# Patient Record
Sex: MALE | Race: WHITE | Employment: FULL TIME | URBAN - METROPOLITAN AREA
[De-identification: names, ages, dates, MRNs, and addresses within clinical notes are randomized per-mention and may not be internally consistent; named-entity substitution may affect disease eponyms.]

---

## 2017-06-13 ENCOUNTER — HOSPITAL ENCOUNTER (OUTPATIENT)
Dept: CT IMAGING | Age: 57
Discharge: HOME | End: 2017-06-13
Attending: INTERNAL MEDICINE
Payer: SELF-PAY

## 2017-06-13 DIAGNOSIS — Z91.89 CARDIOVASCULAR RISK FACTOR: ICD-10-CM

## 2017-06-13 PROCEDURE — 75571 CT HRT W/O DYE W/CA TEST: CPT

## 2021-09-22 PROBLEM — R35.1 BENIGN PROSTATIC HYPERPLASIA WITH NOCTURIA: Status: ACTIVE | Noted: 2021-09-22

## 2021-09-22 PROBLEM — Z76.89 ENCOUNTER TO ESTABLISH CARE: Status: ACTIVE | Noted: 2021-09-22

## 2021-09-22 PROBLEM — N40.1 BENIGN PROSTATIC HYPERPLASIA WITH NOCTURIA: Status: ACTIVE | Noted: 2021-09-22

## 2021-09-22 PROBLEM — M21.612 BUNION, LEFT: Status: ACTIVE | Noted: 2021-09-22

## 2022-10-05 ENCOUNTER — OFFICE VISIT (OUTPATIENT)
Dept: INTERNAL MEDICINE CLINIC | Facility: CLINIC | Age: 62
End: 2022-10-05
Payer: COMMERCIAL

## 2022-10-05 VITALS
RESPIRATION RATE: 12 BRPM | TEMPERATURE: 98.3 F | HEIGHT: 68 IN | SYSTOLIC BLOOD PRESSURE: 121 MMHG | DIASTOLIC BLOOD PRESSURE: 85 MMHG | BODY MASS INDEX: 25.67 KG/M2 | WEIGHT: 169.4 LBS | OXYGEN SATURATION: 98 % | HEART RATE: 59 BPM

## 2022-10-05 DIAGNOSIS — Z13.21 ENCOUNTER FOR VITAMIN DEFICIENCY SCREENING: ICD-10-CM

## 2022-10-05 DIAGNOSIS — Z13.220 ENCOUNTER FOR SCREENING FOR LIPID DISORDER: Primary | ICD-10-CM

## 2022-10-05 DIAGNOSIS — Z12.5 PROSTATE CANCER SCREENING: ICD-10-CM

## 2022-10-05 DIAGNOSIS — R13.12 OROPHARYNGEAL DYSPHAGIA: ICD-10-CM

## 2022-10-05 DIAGNOSIS — Z13.1 DIABETES MELLITUS SCREENING: ICD-10-CM

## 2022-10-05 DIAGNOSIS — Z00.00 ANNUAL VISIT FOR GENERAL ADULT MEDICAL EXAMINATION WITHOUT ABNORMAL FINDINGS: ICD-10-CM

## 2022-10-05 PROCEDURE — G8484 FLU IMMUNIZE NO ADMIN: HCPCS | Performed by: INTERNAL MEDICINE

## 2022-10-05 PROCEDURE — 99396 PREV VISIT EST AGE 40-64: CPT | Performed by: INTERNAL MEDICINE

## 2022-10-05 ASSESSMENT — PATIENT HEALTH QUESTIONNAIRE - PHQ9
SUM OF ALL RESPONSES TO PHQ QUESTIONS 1-9: 0
SUM OF ALL RESPONSES TO PHQ QUESTIONS 1-9: 0
SUM OF ALL RESPONSES TO PHQ9 QUESTIONS 1 & 2: 0
SUM OF ALL RESPONSES TO PHQ QUESTIONS 1-9: 0
1. LITTLE INTEREST OR PLEASURE IN DOING THINGS: 0
SUM OF ALL RESPONSES TO PHQ QUESTIONS 1-9: 0
2. FEELING DOWN, DEPRESSED OR HOPELESS: 0

## 2022-10-05 ASSESSMENT — ENCOUNTER SYMPTOMS
RESPIRATORY NEGATIVE: 1
EYES NEGATIVE: 1
VOMITING: 0
NAUSEA: 0

## 2022-10-05 NOTE — PROGRESS NOTES
Niru Suarez D.O. [facility]  Jeffrey Diadema [address] [place] [address]  Tel: [phone]    History and Physical Office Visit     Patient Name: Maggie Landers    :  1960   MRN:   [id number]      Today's Date: 10/05/22 8:09 AM    Subjective     The patient is a 60y.o. year old male with a pmh as listed below. The patient presents today for a physical.    Diet: He states he has been eating pretty healthy in the last year. Exercise: He exercises on most days   Alcohol: he will drink 5 glasses of wine a week and 3 glasses of beer a week   Cigarettes: Never     SAFETY PLAN:  -He denies any suicidal ideations or homicidal ideations  -Denies any thoughts of harming himself or others  -He states he feels safe at home and has a good support system in the area    Health Maintenance:  -Last PSA 3.6 in   -Last colonoscopy completed in  which showed some polyps, next due in   -Lipid panel from  showed HDL of 49, LDL of 94, triglycerides of 149 and a total cholesterol 153  -Hemoglobin A1c from  was 5.6    New complaints:  He states he is still having some symptoms of weaker urinary stream and difficulty starting going. He states he has not started the Flomax from 1 year ago. He states from time to time he will have difficulty swallowing. He states this occurs more often with meats, but overall it does not happen very often. He states he has the feeling of food getting stuck in his throat, this sometimes will happen with liquids too. He states this happens about once every month. He denies pain, he denies any regurgitation of food. He states he is able to initially swallow the food/liquid but it will feel stuck towards the bottom of his throat. This has been going on for a long time. Review of Systems   Constitutional: Negative. HENT: Negative. Eyes: Negative. Respiratory: Negative. Cardiovascular: Negative.     Gastrointestinal:  Negative for nausea and vomiting. Endocrine: Negative. Genitourinary: Negative. Musculoskeletal: Negative. Skin: Negative. Neurological: Negative. Psychiatric/Behavioral: Negative. Past Medical History:   Diagnosis Date    Benign prostatic hyperplasia with nocturia 9/22/2021    Bunion, left 9/22/2021    Colon polyps     Dislocation of left shoulder joint 02/2020    Encounter to establish care 9/22/2021    History of basal cell carcinoma (BCC) of skin     Back    Prediabetes      Social History     Socioeconomic History    Marital status:      Spouse name: Not on file    Number of children: Not on file    Years of education: Not on file    Highest education level: Not on file   Occupational History    Not on file   Tobacco Use    Smoking status: Never    Smokeless tobacco: Never   Substance and Sexual Activity    Alcohol use: Yes     Alcohol/week: 11.0 standard drinks    Drug use: No    Sexual activity: Not on file   Other Topics Concern    Not on file   Social History Narrative    Not on file     Social Determinants of Health     Financial Resource Strain: Not on file   Food Insecurity: Not on file   Transportation Needs: Not on file   Physical Activity: Not on file   Stress: Not on file   Social Connections: Not on file   Intimate Partner Violence: Not on file   Housing Stability: Not on file         Current Outpatient Medications   Medication Sig    tamsulosin (FLOMAX) 0.4 MG capsule Take 0.4 mg by mouth daily     No current facility-administered medications for this visit. Objective     There were no vitals filed for this visit. Physical Exam:  Constitutional: Appears well kempt. Alert/oriented x3. In no acute distress. Head: Normocephalic No trauma. No deformity. No bruits. Neck: Supple. ROM normal. No tenderness. No masses. Eyes: PERRLA. Conjunctivae normal. No discharge. Ears: External ears normal. TM normal. No discharge from ears. Nose: Nose normal. Nares patent. Throat: Clear.  No exudates. No erythema. Cardiac: Heart with normal rate/rhythm. No murmurs. No gallops. Pulses normal.   Pulmonary: Lungs clear to auscultation bilaterally. In no respiratory distress. No wheezing. No rales. No rhonci. Gastrointestinal: Bowel sounds present. Abdomen soft and nondistended. Musculoskeletal: Moves all extremities with good ROM. Non-tender. No swelling. No edema. Neurological: No numbness. No tingling. Alert and oriented. At baseline. No confusion. Patellar Reflexes 2+. Psychiatric: Normal thought content. Normal behavior. Normal judgment. Recommendations     Assessment:  Patient Active Problem List   Diagnosis    Encounter to establish care    Colon polyps    Prediabetes    Benign prostatic hyperplasia with nocturia      Status of Medical Conditions: stable     Plan:  -Patient is a 60 extremely healthy male who presents for a physical. He has no new complaints except for some dysphagia with solids and liquids that has been going on for a while. His labs from 2021 were fantastic, but he is requesting a new set of labs today since he fasted. -GI referral for dysphagia  -check labs (per patient request)  -follow up with dermatology  -follow up with eye doctor     -Previous medical records and/or labs/tests available to me reviewed, any records outstanding not available requested  -The risks, benefits, and medical necessity of all medications, tests labs, and any other orders that were ordered at today's visit were discussed with the patient including all elective or patient requested orders. Decision to order or not order tests or based on this risk/benefit ratio and medical necessity.  -The patient expresses understanding of the plan as I've explained it to him and is in agreement with the current plan.     Follow up: 1 year    Total Time: 45 minutes (chart review and in-exam time)    Signed: Jeanne Rosas D.O.  10/05/22  8:09 AM

## 2022-10-06 DIAGNOSIS — R97.20 ELEVATED PSA: Primary | ICD-10-CM

## 2022-10-19 ENCOUNTER — OFFICE VISIT (OUTPATIENT)
Dept: UROLOGY | Age: 62
End: 2022-10-19
Payer: COMMERCIAL

## 2022-10-19 DIAGNOSIS — N13.8 BPH WITH OBSTRUCTION/LOWER URINARY TRACT SYMPTOMS: ICD-10-CM

## 2022-10-19 DIAGNOSIS — R97.20 ELEVATED PSA: Primary | ICD-10-CM

## 2022-10-19 DIAGNOSIS — N40.1 BPH WITH OBSTRUCTION/LOWER URINARY TRACT SYMPTOMS: ICD-10-CM

## 2022-10-19 LAB
BILIRUBIN, URINE, POC: NEGATIVE
BLOOD URINE, POC: NEGATIVE
GLUCOSE URINE, POC: NEGATIVE
KETONES, URINE, POC: NEGATIVE
LEUKOCYTE ESTERASE, URINE, POC: NEGATIVE
NITRITE, URINE, POC: NEGATIVE
PH, URINE, POC: 5.5 (ref 4.6–8)
PROTEIN,URINE, POC: NEGATIVE
PVR, POC: ABNORMAL CC
SPECIFIC GRAVITY, URINE, POC: 1.03 (ref 1–1.03)
URINALYSIS CLARITY, POC: ABNORMAL
URINALYSIS COLOR, POC: ABNORMAL
UROBILINOGEN, POC: ABNORMAL

## 2022-10-19 PROCEDURE — G8419 CALC BMI OUT NRM PARAM NOF/U: HCPCS | Performed by: NURSE PRACTITIONER

## 2022-10-19 PROCEDURE — 81003 URINALYSIS AUTO W/O SCOPE: CPT | Performed by: NURSE PRACTITIONER

## 2022-10-19 PROCEDURE — 1036F TOBACCO NON-USER: CPT | Performed by: NURSE PRACTITIONER

## 2022-10-19 PROCEDURE — G8427 DOCREV CUR MEDS BY ELIG CLIN: HCPCS | Performed by: NURSE PRACTITIONER

## 2022-10-19 PROCEDURE — G8484 FLU IMMUNIZE NO ADMIN: HCPCS | Performed by: NURSE PRACTITIONER

## 2022-10-19 PROCEDURE — 51798 US URINE CAPACITY MEASURE: CPT | Performed by: NURSE PRACTITIONER

## 2022-10-19 PROCEDURE — 99204 OFFICE O/P NEW MOD 45 MIN: CPT | Performed by: NURSE PRACTITIONER

## 2022-10-19 PROCEDURE — 3017F COLORECTAL CA SCREEN DOC REV: CPT | Performed by: NURSE PRACTITIONER

## 2022-10-19 RX ORDER — TAMSULOSIN HYDROCHLORIDE 0.4 MG/1
0.4 CAPSULE ORAL DAILY
Qty: 90 CAPSULE | Refills: 1 | Status: SHIPPED | OUTPATIENT
Start: 2022-10-19

## 2022-10-19 ASSESSMENT — ENCOUNTER SYMPTOMS
EYES NEGATIVE: 1
DIARRHEA: 1
RESPIRATORY NEGATIVE: 1

## 2022-10-19 NOTE — PROGRESS NOTES
[facility]  [address]    Renetta Flower [address], [address] [facility]  [phone]          Jazmine Alvarez  : 1960    Chief Complaint   Patient presents with    New Patient    Elevated PSA          HPI     Jazmine Alvarez is a 60 y.o. male w hx of BPH being seen today as a new pt referred by PCP for elevated PSA. Trend below:  Lab Results   Component Value Date    PSA 4.5 (H) 10/05/2022    PSA 3.6 11/10/2020    PSA 2.0 2019     He reports baseline LUTS of UU, decreased output at times, weak stream, and nocturia x 1. He was prescribed flomax about 1 yr ago, but did not start. LUTS are worsened w alcohol intake. PVR 23 cc via u/s. No family hx of  Ca. Past Medical History:   Diagnosis Date    Benign prostatic hyperplasia with nocturia 2021    Bunion, left 2021    Colon polyps     Dislocation of left shoulder joint 2020    Encounter to establish care 2021    History of basal cell carcinoma (BCC) of skin     Back    Prediabetes      Past Surgical History:   Procedure Laterality Date    COLONOSCOPY  2017    Recall 5 years    SKIN BIOPSY  2019    abdomen     Current Outpatient Medications   Medication Sig Dispense Refill    tamsulosin (FLOMAX) 0.4 MG capsule Take 1 capsule by mouth daily 90 capsule 1     No current facility-administered medications for this visit. Allergies   Allergen Reactions    Iodides Anaphylaxis and Hives    Shellfish Allergy Anaphylaxis and Hives     Other reaction(s): Unknown-Unspecified     Social History     Socioeconomic History    Marital status:      Spouse name: Not on file    Number of children: Not on file    Years of education: Not on file    Highest education level: Not on file   Occupational History    Not on file   Tobacco Use    Smoking status: Never    Smokeless tobacco: Never   Substance and Sexual Activity    Alcohol use:  Yes     Alcohol/week: 11.0 standard drinks    Drug use: No    Sexual activity: Not on file   Other Topics Concern    Not on file   Social History Narrative    Not on file     Social Determinants of Health     Financial Resource Strain: Not on file   Food Insecurity: Not on file   Transportation Needs: Not on file   Physical Activity: Not on file   Stress: Not on file   Social Connections: Not on file   Intimate Partner Violence: Not on file   Housing Stability: Not on file     Family History   Problem Relation Age of Onset    Heart Disease Father         pacemaker    Osteoarthritis Father     Osteoarthritis Mother         melanoma    Diabetes Father        Review of Systems  Constitutional: Negative  Skin: Negative skin ROS  Eyes: Eyes negative  ENT: HENT negative  Respiratory: Respiratory negative  Cardiovascular: Neg cardio ROS  GI: Neg GI ROSPositive for diarrhea. Genitourinary: Positive for nocturia, slower stream, frequent urination, incomplete emptying and erectile dysfunction.   Musculoskeletal: Musculoskeletal negative  Psychological: Neg psych ROS  Endocrine: Endocrine negative  Hem/Lymphatic: Hematologic/lymphatic negative    Urinalysis  UA - Dipstick  Results for orders placed or performed in visit on 10/19/22   AMB POC URINALYSIS DIP STICK AUTO W/O MICRO   Result Value Ref Range    Color (UA POC)      Clarity (UA POC)      Glucose, Urine, POC Negative Negative    Bilirubin, Urine, POC Negative Negative    KETONES, Urine, POC Negative Negative    Specific Gravity, Urine, POC 1.030 1.001 - 1.035    Blood (UA POC) Negative Negative    pH, Urine, POC 5.5 4.6 - 8.0    Protein, Urine, POC Negative Negative    Urobilinogen, POC 2 mg/dL     Nitrite, Urine, POC Negative Negative    Leukocyte Esterase, Urine, POC Negative Negative       PHYSICAL EXAM    General appearance - well appearing and in no distress  Mental status - alert, oriented to person, place, and time  Neck - supple, no significant adenopathy  Chest/Lung-  Quiet, even and easy respiratory effort without use of accessory muscles  Rectal/DIANA- symmetrical enlargement; no nodules   Skin - normal coloration and turgor, no rashes      Assessment and Plan    ICD-10-CM    1. Elevated PSA  R97.20 AMB POC URINALYSIS DIP STICK AUTO W/O MICRO     AMB POC PVR, JANESSA,POST-VOID RES,US,NON-IMAGING      2. BPH with obstruction/lower urinary tract symptoms  N40.1     N13.8         We first discussed the physiology of psa. We also discussed potential causes of elevated psa including prostate cancer, inflammation, infection, BPH, and idiopathic elevations. Treatment options including rechecking psa vs. MRI prostate vs. going forward with prostate biopsy were discussed. Risks, benefits and alternatives were discussed. After weighing his options, the patient has decided to move forward with MRI. Start flomax. SE discussed. Avoid bladder irritants. F/u pending MRI. To call sooner if needed. Elizabeth Santacruz is supervising physician today and he approves plan of care.